# Patient Record
(demographics unavailable — no encounter records)

---

## 2024-10-26 NOTE — PHYSICAL EXAM

## 2024-10-26 NOTE — DISCUSSION/SUMMARY
[] : The components of the vaccine(s) to be administered today are listed in the plan of care. The disease(s) for which the vaccine(s) are intended to prevent and the risks have been discussed with the caretaker.  The risks are also included in the appropriate vaccination information statements which have been provided to the patient's caregiver.  The caregiver has given consent to vaccinate. [FreeTextEntry1] : VIJAYA is a 15 year F presenting for [facility]. HEADSSS completed, with teen privately.   WCC - Growth and development reviewed - Anticipatory guidance and routine care provided - RTC for next WCC and prn - Screening: Vision, Color Test, Hearing - Labs: routine Labs drawn in office by EVANGELINA Herrera - Immunizations: HPV#2, Influenza - Referrals: GYN  Teen AG: Continue balanced diet with all food groups. Brush teeth twice a day with toothbrush. Recommend visit to dentist. Maintain consistent daily routines and sleep schedule. Personal hygiene, puberty, and sexual health reviewed. Risky behaviors assessed. School discussed. Limit screen time to no more than 2 hours per day. Encourage physical activity.   Caretaker expressed understanding of the plan and agrees. No other concerns or questions today.

## 2024-10-26 NOTE — HISTORY OF PRESENT ILLNESS
[Yes] : Patient goes to dentist yearly [Toothpaste] : Primary Fluoride Source: Toothpaste [Up to date] : Up to date [LMP: _____] : LMP: [unfilled] [Days of Bleeding: _____] : Days of bleeding: [unfilled] [Age of Menarche: ____] : Age of Menarche: [unfilled] [Eats meals with family] : eats meals with family [Grade: ____] : Grade: [unfilled] [Eats regular meals including adequate fruits and vegetables] : eats regular meals including adequate fruits and vegetables [Has friends] : has friends [Has interests/participates in community activities/volunteers] : has interests/participates in community activities/volunteers. [Uses safety belts/safety equipment] : uses safety belts/safety equipment  [Has peer relationships free of violence] : has peer relationships free of violence [No] : Patient has not had sexual intercourse. 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 tech cna in pediatrics [de-identified] : safety discussed, see aaron  [de-identified] : safety discussed, see phq-a  [FreeTextEntry1] : New Patient History MHx: none PMHx: none PSHx: none BHx: ex32wk?, , short nicu DHx: 10th, xray tech cna in pediatrics All: nkda, possible lactose intolerance  Med: probiotics FHx: mom (diverticulitis, supposed to get surgery), dad (htn?) SHx: lives at home with grandma (for now, mom and dad moving from [place] later), mother (cna), father (maintenance), 1 other sibs (not living with [facility]), no pets, no smokers, no guns at home PMD previous: [place] Vaccination status: up to date  does exercise  appt for dentist next month

## 2024-10-26 NOTE — HISTORY OF PRESENT ILLNESS
[Yes] : Patient goes to dentist yearly [Toothpaste] : Primary Fluoride Source: Toothpaste [Up to date] : Up to date [LMP: _____] : LMP: [unfilled] [Days of Bleeding: _____] : Days of bleeding: [unfilled] [Age of Menarche: ____] : Age of Menarche: [unfilled] [Eats meals with family] : eats meals with family [Grade: ____] : Grade: [unfilled] [Eats regular meals including adequate fruits and vegetables] : eats regular meals including adequate fruits and vegetables [Has friends] : has friends [Has interests/participates in community activities/volunteers] : has interests/participates in community activities/volunteers. [Uses safety belts/safety equipment] : uses safety belts/safety equipment  [Has peer relationships free of violence] : has peer relationships free of violence [No] : Patient has not had sexual intercourse. 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 tech cna in pediatrics [de-identified] : safety discussed, see aaron  [de-identified] : safety discussed, see phq-a  [FreeTextEntry1] : New Patient History MHx: none PMHx: none PSHx: none BHx: ex32wk?, , short nicu DHx: 10th, xray tech cna in pediatrics All: nkda, possible lactose intolerance  Med: probiotics FHx: mom (diverticulitis, supposed to get surgery), dad (htn?) SHx: lives at home with grandma (for now, mom and dad moving [place]), mother (cna), father (maintenance), 1 other sibs (not living with [facility]), no pets, no smokers, no guns at home [place] previous[place] Vaccination status: up to date  does exercise  appt for dentist next month

## 2024-10-26 NOTE — DISCUSSION/SUMMARY
[] : The components of the vaccine(s) to be administered today are listed in the plan of care. The disease(s) for which the vaccine(s) are intended to prevent and the risks have been discussed with the caretaker.  The risks are also included in the appropriate vaccination information statements which have been provided to the patient's caregiver.  The caregiver has given consent to vaccinate. [FreeTextEntry1] : VIJAYA is a 15 year F presenting for [facility]. HEADSSS completed, with teen privately.   WCC - Growth and development reviewed - Anticipatory guidance and routine care provided - RTC for next WCC and prn - Screening: Vision, Color Test, Hearing - Labs: routine Labs drawn in office by EVANGELINA Herrera - Immunizations: HPV#2, Influenza - Referrals: GYN  Teen AG: Continue balanced diet with all food groups. Brush teeth twice a day with toothbrush. Recommend visit to dentist. Maintain consistent daily routines and sleep schedule. Personal hygiene, puberty, and sexual health reviewed. Risky behaviors assessed. School discussed. Limit screen time to no more than 2 hours per day. Encourage physical activity.   Caretaker expressed understanding of the plan and agrees. No other concerns or questions today.

## 2024-10-26 NOTE — DISCUSSION/SUMMARY
[] : The components of the vaccine(s) to be administered today are listed in the plan of care. The disease(s) for which the vaccine(s) are intended to prevent and the risks have been discussed with the caretaker.  The risks are also included in the appropriate vaccination information statements which have been provided to the patient's caregiver.  The caregiver has given consent to vaccinate. [FreeTextEntry1] : VIJAYA is a 15 year F presenting for [facility]. HEADSSS completed, with teen privately.   WCC - Growth and development reviewed - Anticipatory guidance and routine care provided - RTC for next WCC and prn - Screening: Vision, Color Test, Hearing - Labs: routine Labs drawn in office by EVANGELINA Herrera - Immunizations: HPV#2, Influenza - Referrals: GYN  Teen AG: Continue balanced diet with all food groups. Brush teeth twice a day with toothbrush. Recommend visit to dentist. Maintain consistent daily routines and sleep schedule. Personal hygiene, puberty, and sexual health reviewed. Risky behaviors assessed. School discussed. Limit screen time to no more than 2 hours per day. Encourage physical activity.   Caretaker expressed understanding of the plan and agrees. No other concerns or questions today.

## 2024-10-26 NOTE — HISTORY OF PRESENT ILLNESS
[Yes] : Patient goes to dentist yearly [Toothpaste] : Primary Fluoride Source: Toothpaste [Up to date] : Up to date [LMP: _____] : LMP: [unfilled] [Days of Bleeding: _____] : Days of bleeding: [unfilled] [Age of Menarche: ____] : Age of Menarche: [unfilled] [Eats meals with family] : eats meals with family [Grade: ____] : Grade: [unfilled] [Eats regular meals including adequate fruits and vegetables] : eats regular meals including adequate fruits and vegetables [Has friends] : has friends [Has interests/participates in community activities/volunteers] : has interests/participates in community activities/volunteers. [Uses safety belts/safety equipment] : uses safety belts/safety equipment  [Has peer relationships free of violence] : has peer relationships free of violence [No] : Patient has not had sexual intercourse. 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 tech cna in pediatrics [de-identified] : safety discussed, see aaron  [de-identified] : safety discussed, see phq-a  [FreeTextEntry1] : New Patient History MHx: none PMHx: none PSHx: none BHx: ex32wk?, , short nicu DHx: 10th, xray tech cna in pediatrics All: nkda, possible lactose intolerance  Med: probiotics FHx: mom (diverticulitis, supposed to get surgery), dad (htn?) SHx: lives at home with grandma (for now, mom and dad moving from [place] later), mother (cna), father (maintenance), 1 other sibs (not living with [facility]), no pets, no smokers, no guns at home [place] previous: [place] Vaccination status: up to date  does exercise  appt for dentist next month

## 2024-11-18 NOTE — PHYSICAL EXAM
[Erythematous Oropharynx] : erythematous oropharynx [Enlarged Tonsils] : enlarged tonsils [Wheezing] : wheezing [NL] : warm, clear
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
## 2024-12-07 NOTE — HISTORY OF PRESENT ILLNESS
[de-identified] : sick [FreeTextEntry6] : 16y/o F h/o albuterol use p/w HA, chills, stuffy nose, sore throat, weakness, dizziness upon standing, slight cough, and decreased appetite for solids x2d.

## 2025-02-05 NOTE — HISTORY OF PRESENT ILLNESS
[Constant] : constant [de-identified] : both eyes are swollen,red and with  mucus for several days now both eyes

## 2025-02-05 NOTE — PHYSICAL EXAM
[Alert] : alert [Tired appearing] : tired appearing [Conjuctival Injection] : conjunctival injection [Eyelid Swelling] : eyelid swelling [Bilateral] : (bilateral) [NL] : warm, clear

## 2025-02-05 NOTE — REVIEW OF SYSTEMS
[Malaise] : malaise [Eye Discharge] : eye discharge [Eye Redness] : eye redness [Itchy Eyes] : itchy eyes [Negative] : Genitourinary